# Patient Record
Sex: MALE
[De-identification: names, ages, dates, MRNs, and addresses within clinical notes are randomized per-mention and may not be internally consistent; named-entity substitution may affect disease eponyms.]

---

## 2023-08-23 PROBLEM — Z00.00 ENCOUNTER FOR PREVENTIVE HEALTH EXAMINATION: Status: ACTIVE | Noted: 2023-08-23

## 2023-08-24 ENCOUNTER — APPOINTMENT (OUTPATIENT)
Dept: ORTHOPEDIC SURGERY | Facility: CLINIC | Age: 38
End: 2023-08-24
Payer: COMMERCIAL

## 2023-08-24 DIAGNOSIS — S72.002D FRACTURE OF UNSPECIFIED PART OF NECK OF LEFT FEMUR, SUBSEQUENT ENCOUNTER FOR CLOSED FRACTURE WITH ROUTINE HEALING: ICD-10-CM

## 2023-08-24 PROCEDURE — 72170 X-RAY EXAM OF PELVIS: CPT

## 2023-08-24 PROCEDURE — 99203 OFFICE O/P NEW LOW 30 MIN: CPT

## 2023-08-24 NOTE — DISCUSSION/SUMMARY
[de-identified] : 38-year-old gentleman s/p ORIF left femoral neck at [facility] 18 months ago now with neuropathic pain around the scar -X-ray and physical exam findings were discussed with the patient -Long discussion was held with the patient and his significant other regarding the new onset intermittent pain the patient is having. -We will move forward with symptomatic treatment at this time with a topical gel.  If this persists then we will discuss other potential options. -Voltaren Gel OTC  -Physical Therapy -Follow-up in 3 months.  No x-rays needed at that time -All of the patient's questions and concerns were addressed.

## 2023-08-24 NOTE — PHYSICAL EXAM
[de-identified] : The patient is sitting comfortably in the exam room.  LEFT hip -Skin is intact, no swelling, no ecchymosis -incision is well-healed, no erythema -No tenderness to palpation over the greater trochanter -Painless range of motion hip -Sensation is intact L1-S1 -5/5 EHL, FHL, TA, GS, quadriceps, hamstrings -Foot is warm and well-perfused, palpable dorsalis pedis pulse  [de-identified] : AP pelvis was taken in the office today.  X-rays show good overall alignment of the pelvis.  Sacroiliac joints are well aligned and the pubic symphysis is well reduced with no diastases.  The left hip has an FNS device.  The left lesser trochanter is more proximal than the right side.  The femoral neck fracture on the left side appears well-healed and remodeled.

## 2023-08-24 NOTE — HISTORY OF PRESENT ILLNESS
[de-identified] : Mr. MARZENA NIELSON is a 38 year  Male underwent ORIF of  L femoral neck fracture approximately 18 months ago after a fall while playing roller-hockey.  He presents today with complaints of L lateral sided hip pain and burning that occurs mostly in the morning, is intermittent, and occurs every few days. He states he exercises using a recumbant bike. He has not had any recent physical therapy. He presents with images from [facility] for review.